# Patient Record
Sex: FEMALE | Race: OTHER | NOT HISPANIC OR LATINO
[De-identification: names, ages, dates, MRNs, and addresses within clinical notes are randomized per-mention and may not be internally consistent; named-entity substitution may affect disease eponyms.]

---

## 2017-07-26 ENCOUNTER — APPOINTMENT (OUTPATIENT)
Dept: ORTHOPEDIC SURGERY | Facility: CLINIC | Age: 77
End: 2017-07-26

## 2017-07-26 DIAGNOSIS — M21.40 FLAT FOOT [PES PLANUS] (ACQUIRED), UNSPECIFIED FOOT: ICD-10-CM

## 2017-07-26 DIAGNOSIS — M79.89 OTHER SPECIFIED SOFT TISSUE DISORDERS: ICD-10-CM

## 2017-07-26 DIAGNOSIS — M21.6X9 OTHER ACQUIRED DEFORMITIES OF UNSPECIFIED FOOT: ICD-10-CM

## 2017-12-21 ENCOUNTER — RX RENEWAL (OUTPATIENT)
Age: 77
End: 2017-12-21

## 2019-07-07 PROBLEM — M21.40 ACQUIRED PES PLANUS: Status: ACTIVE | Noted: 2017-07-26

## 2023-05-01 ENCOUNTER — APPOINTMENT (OUTPATIENT)
Dept: VASCULAR SURGERY | Facility: CLINIC | Age: 83
End: 2023-05-01
Payer: MEDICARE

## 2023-05-01 VITALS
SYSTOLIC BLOOD PRESSURE: 125 MMHG | WEIGHT: 158 LBS | HEIGHT: 64 IN | TEMPERATURE: 97.6 F | HEART RATE: 57 BPM | DIASTOLIC BLOOD PRESSURE: 78 MMHG | BODY MASS INDEX: 26.98 KG/M2

## 2023-05-01 PROCEDURE — 93970 EXTREMITY STUDY: CPT

## 2023-05-01 PROCEDURE — 99203 OFFICE O/P NEW LOW 30 MIN: CPT

## 2023-05-01 NOTE — PHYSICAL EXAM
[de-identified] : On physical examination the patient is in no acute distress and neurologically intact. The lungs are clear to auscultation and the heart has a regular rate and rhythm. Abdomen is benign. Bilateral femoral and pedal pulses are palpable. Spider veins of bilateral thighs and legs. No lower extremity edema or wounds present.

## 2023-05-01 NOTE — ASSESSMENT
[FreeTextEntry1] : In summary, Mrs. Sanches presents with bilateral calf numbness. She has no evidence of arterial insufficiency per history and on exam.  A venous duplex was performed in the office today and showed no evidence of DVT, SVT or reflux in either extremity. I suspect that her symptoms are neuromusculoskeletal in nature. I have advised her to follow up with orthopedics and neurology.\par \par Thank you for allowing me to participate in the care of this nice lady. Please do not hesitate to contact me with any questions. \par

## 2023-05-01 NOTE — HISTORY OF PRESENT ILLNESS
[FreeTextEntry1] : I have just had the pleasure of seeing Mrs. Yajaira Sanches in consultation from Dr. Rose for lower extremity arterial and venous insufficiency. \par \par Mrs. Sanches is a katie 82-year-old lady who presents with a 20 year history of lower extremity paresthesia. She complains of numbness in the back of bilateral calves that is improved with ambulation. She denies any symptoms of lower extremity claudication, rest pain, or tissue loss. She reports a history of lumbago. She reports a history of spider veins of the lower extremities. She denies any history of lower extremity edema, skin pigmentation changes or ulcers. She denies any history of DVT or SVT. She has not had any prior vascular surgical intervention on her lower extremities. She does not presently wear compression stockings.\par \par She denies any history of CAD, MI, CHF, CVA, TIA, CRI, or DM. \par \par Her medical history is otherwise significant for hypothyroidism \par \par Medications: Synthroid \par \par Allergies: NDKA\par \par Social history: Non-smoker\par \par FH: NC\par

## 2024-04-30 ENCOUNTER — APPOINTMENT (OUTPATIENT)
Dept: ORTHOPEDIC SURGERY | Facility: CLINIC | Age: 84
End: 2024-04-30

## 2024-11-26 ENCOUNTER — APPOINTMENT (OUTPATIENT)
Dept: NEPHROLOGY | Facility: CLINIC | Age: 84
End: 2024-11-26

## 2025-04-30 ENCOUNTER — APPOINTMENT (OUTPATIENT)
Dept: NEPHROLOGY | Facility: CLINIC | Age: 85
End: 2025-04-30